# Patient Record
Sex: FEMALE | Race: WHITE | NOT HISPANIC OR LATINO | URBAN - METROPOLITAN AREA
[De-identification: names, ages, dates, MRNs, and addresses within clinical notes are randomized per-mention and may not be internally consistent; named-entity substitution may affect disease eponyms.]

---

## 2021-04-01 ENCOUNTER — OFFICE (OUTPATIENT)
Dept: URBAN - METROPOLITAN AREA CLINIC 106 | Facility: CLINIC | Age: 76
End: 2021-04-01
Payer: COMMERCIAL

## 2021-04-01 VITALS
SYSTOLIC BLOOD PRESSURE: 132 MMHG | WEIGHT: 200 LBS | TEMPERATURE: 97.7 F | HEART RATE: 100 BPM | DIASTOLIC BLOOD PRESSURE: 80 MMHG | HEIGHT: 65 IN

## 2021-04-01 DIAGNOSIS — K52.831 COLLAGENOUS COLITIS: ICD-10-CM

## 2021-04-01 DIAGNOSIS — Z85.038 PERSONAL HISTORY OF OTHER MALIGNANT NEOPLASM OF LARGE INTEST: ICD-10-CM

## 2021-04-01 PROCEDURE — 99204 OFFICE O/P NEW MOD 45 MIN: CPT | Performed by: SPECIALIST

## 2021-04-01 RX ORDER — SODIUM SULFATE, POTASSIUM SULFATE, MAGNESIUM SULFATE 17.5; 3.13; 1.6 G/ML; G/ML; G/ML
SOLUTION, CONCENTRATE ORAL
Qty: 1 | Refills: 0 | Status: ACTIVE
Start: 2021-04-01

## 2021-04-01 NOTE — SERVICEHPINOTES
Buffy March   is seen for an initial visit today.    Pt also with reflux and regurgitation  Pt with hx collagenous colitis and 1/2 brother had colon cancer.  Multiple EGD/Colon Dr Oliveros.  She did not tolerate meds for collagenous colitis.  She denies diarrhea or bleeding .   No Barres or celiac by Dr Oliveros op notes/path. Previous evaluations have included endoscopy (2/01, 8/15 no celiac), colonoscopy (10/14 inflammatory polyp) and Hgb/Hct/WBC/MCV/platelet count (also hx iron deficiency anemia). The frequency of bowel movements has been per day (variable).BR

## 2021-05-13 ENCOUNTER — AMBULATORY SURGICAL CENTER (OUTPATIENT)
Dept: URBAN - METROPOLITAN AREA SURGERY 19 | Facility: SURGERY | Age: 76
End: 2021-05-13
Payer: MEDICARE

## 2021-05-13 DIAGNOSIS — K44.9 DIAPHRAGMATIC HERNIA WITHOUT OBSTRUCTION OR GANGRENE: ICD-10-CM

## 2021-05-13 DIAGNOSIS — R12 HEARTBURN: ICD-10-CM

## 2021-05-13 DIAGNOSIS — Z80.0 FAMILY HISTORY OF MALIGNANT NEOPLASM OF DIGESTIVE ORGANS: ICD-10-CM

## 2021-05-13 LAB
COLONOSCOPY STUDY: (no result)
COLONOSCOPY STUDY: (no result)
RELEVANT H&P ENDOSCOPY: (no result)
RELEVANT H&P ENDOSCOPY: (no result)

## 2021-05-13 PROCEDURE — 43235 EGD DIAGNOSTIC BRUSH WASH: CPT | Performed by: SPECIALIST

## 2021-05-13 PROCEDURE — G0105 COLORECTAL SCRN; HI RISK IND: HCPCS | Performed by: SPECIALIST

## 2022-12-01 ENCOUNTER — OFFICE (OUTPATIENT)
Dept: URBAN - METROPOLITAN AREA CLINIC 67 | Facility: CLINIC | Age: 77
End: 2022-12-01

## 2022-12-01 DIAGNOSIS — K44.9 DIAPHRAGMATIC HERNIA WITHOUT OBSTRUCTION OR GANGRENE: ICD-10-CM

## 2022-12-01 DIAGNOSIS — Z80.0 FAMILY HISTORY OF MALIGNANT NEOPLASM OF DIGESTIVE ORGANS: ICD-10-CM

## 2022-12-01 PROCEDURE — 99214 OFFICE O/P EST MOD 30 MIN: CPT | Performed by: SPECIALIST

## 2022-12-01 RX ORDER — PANTOPRAZOLE SODIUM 40 MG/1
40 TABLET, DELAYED RELEASE ORAL
Qty: 30 | Refills: 1 | Status: ACTIVE
Start: 2022-12-01

## 2022-12-01 RX ORDER — LACTOBACIL 2/BIFIDO 1/S.THERMO 900B CELL
PACKET (EA) ORAL
Qty: 60 | Refills: 0 | Status: ACTIVE

## 2023-01-03 VITALS
HEIGHT: 65 IN | DIASTOLIC BLOOD PRESSURE: 70 MMHG | HEART RATE: 87 BPM | OXYGEN SATURATION: 98 % | SYSTOLIC BLOOD PRESSURE: 128 MMHG

## 2023-03-23 ENCOUNTER — OFFICE (OUTPATIENT)
Dept: URBAN - METROPOLITAN AREA CLINIC 67 | Facility: CLINIC | Age: 78
End: 2023-03-23
Payer: COMMERCIAL

## 2023-03-23 VITALS
DIASTOLIC BLOOD PRESSURE: 79 MMHG | SYSTOLIC BLOOD PRESSURE: 132 MMHG | HEIGHT: 65 IN | HEART RATE: 98 BPM | OXYGEN SATURATION: 99 %

## 2023-03-23 DIAGNOSIS — K52.831 COLLAGENOUS COLITIS: ICD-10-CM

## 2023-03-23 DIAGNOSIS — R53.82 CHRONIC FATIGUE, UNSPECIFIED: ICD-10-CM

## 2023-03-23 DIAGNOSIS — Z80.0 FAMILY HISTORY OF MALIGNANT NEOPLASM OF DIGESTIVE ORGANS: ICD-10-CM

## 2023-03-23 PROCEDURE — 99214 OFFICE O/P EST MOD 30 MIN: CPT | Performed by: SPECIALIST

## 2023-12-11 ENCOUNTER — OFFICE (OUTPATIENT)
Dept: URBAN - METROPOLITAN AREA CLINIC 67 | Facility: CLINIC | Age: 78
End: 2023-12-11
Payer: COMMERCIAL

## 2023-12-11 VITALS
HEIGHT: 65 IN | WEIGHT: 195 LBS | DIASTOLIC BLOOD PRESSURE: 62 MMHG | OXYGEN SATURATION: 98 % | SYSTOLIC BLOOD PRESSURE: 128 MMHG | HEART RATE: 78 BPM

## 2023-12-11 DIAGNOSIS — K52.831 COLLAGENOUS COLITIS: ICD-10-CM

## 2023-12-11 PROCEDURE — 99214 OFFICE O/P EST MOD 30 MIN: CPT | Performed by: SPECIALIST

## 2023-12-11 RX ORDER — BISMUTH SUBSALICYLATE 262 MG/1
1048 TABLET, CHEWABLE ORAL
Qty: 180 | Refills: 1 | Status: ACTIVE
Start: 2023-12-11

## 2023-12-11 NOTE — SERVICEHPINOTES
Buffy March   is seen today for a follow-up visit.    Pt did not tolerated budesonide as it caused swelling of ankles....In 2015 tolerated it.
br
br
brContinued RLQ pain and diarrhea with fecal incontinence.  Pt has severe fatiguebrPt with recent recurrent dysuria and UTI treated by Dr Jacqueline Velazquez then referred Dr Campbell and had CT and cystoscopy.  developed severe non bloody diarrhea and foul smelling stools.  Some incontinence of stool.   Given Lomotil and this resolved after 1 month.  Last EGD/Colon 5/21Pt also with reflux and regurgitationPt with hx collagenous colitis and 1/2 brother had colon cancer.  Multiple EGD/Colon Dr Oliveros.  She did not tolerate meds for collagenous colitis.  She denies diarrhea or bleeding .   No Ballard's or celiac by Dr Oliveros op notes/path. Previous evaluations have included endoscopy (2/01, 8/15 no celiac), colonoscopy (10/14 inflammatory polyp) and Hgb/Hct/WBC/MCV/platelet count (also hx iron deficiency anemia). The frequency of bowel movements has been per day (variable).

## 2023-12-21 LAB
CALPROTECTIN, FECAL: 427 UG/G — HIGH (ref 0–120)
PANCREATIC ELASTASE, FECAL: 381 UG ELAST./G (ref 200–?)

## 2024-03-18 ENCOUNTER — OFFICE (OUTPATIENT)
Dept: URBAN - METROPOLITAN AREA CLINIC 67 | Facility: CLINIC | Age: 79
End: 2024-03-18
Payer: COMMERCIAL

## 2024-03-18 VITALS
RESPIRATION RATE: 14 BRPM | WEIGHT: 204 LBS | DIASTOLIC BLOOD PRESSURE: 72 MMHG | HEART RATE: 76 BPM | HEIGHT: 65 IN | SYSTOLIC BLOOD PRESSURE: 136 MMHG

## 2024-03-18 DIAGNOSIS — R14.0 ABDOMINAL DISTENSION (GASEOUS): ICD-10-CM

## 2024-03-18 DIAGNOSIS — K52.831 COLLAGENOUS COLITIS: ICD-10-CM

## 2024-03-18 DIAGNOSIS — R93.3 ABNORMAL FINDINGS ON DIAGNOSTIC IMAGING OF OTHER PARTS OF DI: ICD-10-CM

## 2024-03-18 PROCEDURE — 99214 OFFICE O/P EST MOD 30 MIN: CPT | Performed by: SPECIALIST

## 2024-03-18 RX ORDER — BISMUTH SUBSALICYLATE 262 MG/1
1048 TABLET, CHEWABLE ORAL
Qty: 180 | Refills: 1 | Status: ACTIVE
Start: 2023-12-11

## 2024-03-18 NOTE — SERVICENOTES
Pt to f/u with Dr Campbell regarding hydronephrosis.  May need repeat CT and /or colonoscopy if issues persist.  Pt should discuss medication with PCP and consider holding Zetia which might be contributing to her diarrhea.  Discussed importance of f/u with Dr Campbell unclear if repeat imaging of pancreas would alter options given age

## 2024-03-18 NOTE — SERVICEHPINOTES
Buffy March   is seen today for a follow-up visit.    Pt is feeling better after prednisone taper.  still not back to baseline.  Had some ankle edema while [place] but this has resolved.  Pt had CT 12/23 and had left sided hydronephrosis and ? colitis and hypodense lesion of pancreas.  Was scheduled for f/u with Dr Campbell but she canceled.  last stool neg c diff but elevated calprotectin and budesonide recommended but she refused to take because of her history of edema.  She did get some improvement after steroid taper.  No allergies to ASA  br

br
br
br  Prior notes:br
br
br  Pt states she did not tolerated budesonide as it caused swelling of ankles....In 2015 tolerated per outside recordsPt with history recurrent dysuria and UTI treated by Dr Jacqueline Velazquez then referred Dr Campbell and had CT and cystoscopy.  developed severe non bloody diarrhea and foul smelling stools.  Some incontinence of stool.   Given Lomotil and this resolved after 1 month.  Last EGD/Colon 5/21...hiatal hernia and normal colon to terminal ileumPt also with reflux and regurgitationPt with hx collagenous colitis and 1/2 brother had colon cancer.  Multiple EGD/Colon Dr Oliveros.  She did not tolerate meds for collagenous colitis.  She denies diarrhea or bleeding .   No Ballard's or celiac by Dr Oliveros op notes/path. Previous evaluations by Dr Oliveros have included endoscopy (2/01, 8/15 no celiac), colonoscopy (10/14 inflammatory polyp) and Hgb/Hct/WBC/MCV/platelet count